# Patient Record
Sex: FEMALE | Race: ASIAN | NOT HISPANIC OR LATINO
[De-identification: names, ages, dates, MRNs, and addresses within clinical notes are randomized per-mention and may not be internally consistent; named-entity substitution may affect disease eponyms.]

---

## 2017-01-30 ENCOUNTER — APPOINTMENT (OUTPATIENT)
Dept: ELECTROPHYSIOLOGY | Facility: CLINIC | Age: 82
End: 2017-01-30

## 2017-07-01 ENCOUNTER — OUTPATIENT (OUTPATIENT)
Dept: OUTPATIENT SERVICES | Facility: HOSPITAL | Age: 82
LOS: 1 days | End: 2017-07-01
Payer: MEDICAID

## 2017-07-01 DIAGNOSIS — H26.9 UNSPECIFIED CATARACT: Chronic | ICD-10-CM

## 2017-07-01 DIAGNOSIS — Z98.61 CORONARY ANGIOPLASTY STATUS: Chronic | ICD-10-CM

## 2017-07-01 DIAGNOSIS — Z98.89 OTHER SPECIFIED POSTPROCEDURAL STATES: Chronic | ICD-10-CM

## 2017-07-18 DIAGNOSIS — R69 ILLNESS, UNSPECIFIED: ICD-10-CM

## 2017-08-01 PROCEDURE — G9001: CPT

## 2017-11-28 ENCOUNTER — APPOINTMENT (OUTPATIENT)
Dept: ELECTROPHYSIOLOGY | Facility: CLINIC | Age: 82
End: 2017-11-28
Payer: MEDICARE

## 2017-11-28 PROCEDURE — 93294 REM INTERROG EVL PM/LDLS PM: CPT

## 2017-11-28 PROCEDURE — 93296 REM INTERROG EVL PM/IDS: CPT

## 2018-03-12 ENCOUNTER — APPOINTMENT (OUTPATIENT)
Dept: ELECTROPHYSIOLOGY | Facility: CLINIC | Age: 83
End: 2018-03-12

## 2018-06-04 ENCOUNTER — APPOINTMENT (OUTPATIENT)
Dept: ELECTROPHYSIOLOGY | Facility: CLINIC | Age: 83
End: 2018-06-04
Payer: MEDICARE

## 2018-06-04 DIAGNOSIS — I47.1 SUPRAVENTRICULAR TACHYCARDIA: ICD-10-CM

## 2018-06-04 PROCEDURE — 93296 REM INTERROG EVL PM/IDS: CPT

## 2018-06-04 PROCEDURE — 93294 REM INTERROG EVL PM/LDLS PM: CPT

## 2018-10-01 ENCOUNTER — APPOINTMENT (OUTPATIENT)
Dept: ELECTROPHYSIOLOGY | Facility: CLINIC | Age: 83
End: 2018-10-01
Payer: MEDICARE

## 2018-10-01 DIAGNOSIS — R00.2 PALPITATIONS: ICD-10-CM

## 2018-10-01 PROCEDURE — 93296 REM INTERROG EVL PM/IDS: CPT

## 2018-10-01 PROCEDURE — 93294 REM INTERROG EVL PM/LDLS PM: CPT

## 2019-01-07 ENCOUNTER — APPOINTMENT (OUTPATIENT)
Dept: ELECTROPHYSIOLOGY | Facility: CLINIC | Age: 84
End: 2019-01-07
Payer: MEDICARE

## 2019-01-07 PROCEDURE — 93299: CPT

## 2019-01-07 PROCEDURE — 93298 REM INTERROG DEV EVAL SCRMS: CPT

## 2019-04-10 ENCOUNTER — APPOINTMENT (OUTPATIENT)
Dept: ELECTROPHYSIOLOGY | Facility: CLINIC | Age: 84
End: 2019-04-10

## 2019-05-20 ENCOUNTER — APPOINTMENT (OUTPATIENT)
Dept: ELECTROPHYSIOLOGY | Facility: CLINIC | Age: 84
End: 2019-05-20
Payer: MEDICARE

## 2019-05-20 PROCEDURE — 93296 REM INTERROG EVL PM/IDS: CPT

## 2019-05-20 PROCEDURE — 93294 REM INTERROG EVL PM/LDLS PM: CPT

## 2019-09-06 ENCOUNTER — APPOINTMENT (OUTPATIENT)
Dept: ELECTROPHYSIOLOGY | Facility: CLINIC | Age: 84
End: 2019-09-06
Payer: MEDICARE

## 2019-09-06 PROCEDURE — 93294 REM INTERROG EVL PM/LDLS PM: CPT

## 2019-09-06 PROCEDURE — 93296 REM INTERROG EVL PM/IDS: CPT

## 2020-01-01 ENCOUNTER — OUTPATIENT (OUTPATIENT)
Dept: OUTPATIENT SERVICES | Facility: HOSPITAL | Age: 85
LOS: 1 days | End: 2020-01-01
Payer: MEDICARE

## 2020-01-01 DIAGNOSIS — Z98.61 CORONARY ANGIOPLASTY STATUS: Chronic | ICD-10-CM

## 2020-01-01 DIAGNOSIS — H26.9 UNSPECIFIED CATARACT: Chronic | ICD-10-CM

## 2020-01-01 DIAGNOSIS — Z98.89 OTHER SPECIFIED POSTPROCEDURAL STATES: Chronic | ICD-10-CM

## 2020-01-22 ENCOUNTER — INPATIENT (INPATIENT)
Facility: HOSPITAL | Age: 85
LOS: 1 days | Discharge: HOME | End: 2020-01-24
Attending: INTERNAL MEDICINE | Admitting: INTERNAL MEDICINE
Payer: MEDICARE

## 2020-01-22 ENCOUNTER — TRANSCRIPTION ENCOUNTER (OUTPATIENT)
Age: 85
End: 2020-01-22

## 2020-01-22 VITALS
OXYGEN SATURATION: 100 % | TEMPERATURE: 98 F | DIASTOLIC BLOOD PRESSURE: 80 MMHG | RESPIRATION RATE: 16 BRPM | SYSTOLIC BLOOD PRESSURE: 130 MMHG | HEART RATE: 103 BPM

## 2020-01-22 DIAGNOSIS — I10 ESSENTIAL (PRIMARY) HYPERTENSION: ICD-10-CM

## 2020-01-22 DIAGNOSIS — E11.65 TYPE 2 DIABETES MELLITUS WITH HYPERGLYCEMIA: ICD-10-CM

## 2020-01-22 DIAGNOSIS — R07.9 CHEST PAIN, UNSPECIFIED: ICD-10-CM

## 2020-01-22 DIAGNOSIS — Z98.89 OTHER SPECIFIED POSTPROCEDURAL STATES: Chronic | ICD-10-CM

## 2020-01-22 DIAGNOSIS — R00.2 PALPITATIONS: ICD-10-CM

## 2020-01-22 DIAGNOSIS — E78.5 HYPERLIPIDEMIA, UNSPECIFIED: ICD-10-CM

## 2020-01-22 DIAGNOSIS — Z29.9 ENCOUNTER FOR PROPHYLACTIC MEASURES, UNSPECIFIED: ICD-10-CM

## 2020-01-22 DIAGNOSIS — I24.9 ACUTE ISCHEMIC HEART DISEASE, UNSPECIFIED: ICD-10-CM

## 2020-01-22 DIAGNOSIS — Z95.0 PRESENCE OF CARDIAC PACEMAKER: Chronic | ICD-10-CM

## 2020-01-22 DIAGNOSIS — H26.9 UNSPECIFIED CATARACT: Chronic | ICD-10-CM

## 2020-01-22 DIAGNOSIS — Z98.61 CORONARY ANGIOPLASTY STATUS: Chronic | ICD-10-CM

## 2020-01-22 LAB
ALBUMIN SERPL ELPH-MCNC: 4.4 G/DL — SIGNIFICANT CHANGE UP (ref 3.3–5)
ALP SERPL-CCNC: 79 U/L — SIGNIFICANT CHANGE UP (ref 40–120)
ALT FLD-CCNC: 21 U/L — SIGNIFICANT CHANGE UP (ref 4–33)
ANION GAP SERPL CALC-SCNC: 12 MMO/L — SIGNIFICANT CHANGE UP (ref 7–14)
APTT BLD: 29.9 SEC — SIGNIFICANT CHANGE UP (ref 27.5–36.3)
AST SERPL-CCNC: 24 U/L — SIGNIFICANT CHANGE UP (ref 4–32)
BASE EXCESS BLDV CALC-SCNC: 1.5 MMOL/L — SIGNIFICANT CHANGE UP
BASOPHILS # BLD AUTO: 0.04 K/UL — SIGNIFICANT CHANGE UP (ref 0–0.2)
BASOPHILS NFR BLD AUTO: 0.4 % — SIGNIFICANT CHANGE UP (ref 0–2)
BILIRUB SERPL-MCNC: 0.3 MG/DL — SIGNIFICANT CHANGE UP (ref 0.2–1.2)
BLOOD GAS VENOUS - CREATININE: 0.81 MG/DL — SIGNIFICANT CHANGE UP (ref 0.5–1.3)
BUN SERPL-MCNC: 23 MG/DL — SIGNIFICANT CHANGE UP (ref 7–23)
CALCIUM SERPL-MCNC: 9.1 MG/DL — SIGNIFICANT CHANGE UP (ref 8.4–10.5)
CHLORIDE BLDV-SCNC: 106 MMOL/L — SIGNIFICANT CHANGE UP (ref 96–108)
CHLORIDE SERPL-SCNC: 103 MMOL/L — SIGNIFICANT CHANGE UP (ref 98–107)
CHOLEST SERPL-MCNC: 98 MG/DL — LOW (ref 120–199)
CO2 SERPL-SCNC: 24 MMOL/L — SIGNIFICANT CHANGE UP (ref 22–31)
CREAT SERPL-MCNC: 0.88 MG/DL — SIGNIFICANT CHANGE UP (ref 0.5–1.3)
EOSINOPHIL # BLD AUTO: 0.06 K/UL — SIGNIFICANT CHANGE UP (ref 0–0.5)
EOSINOPHIL NFR BLD AUTO: 0.6 % — SIGNIFICANT CHANGE UP (ref 0–6)
GAS PNL BLDV: 141 MMOL/L — SIGNIFICANT CHANGE UP (ref 136–146)
GLUCOSE BLDC GLUCOMTR-MCNC: 206 MG/DL — HIGH (ref 70–99)
GLUCOSE BLDC GLUCOMTR-MCNC: 254 MG/DL — HIGH (ref 70–99)
GLUCOSE BLDC GLUCOMTR-MCNC: 270 MG/DL — HIGH (ref 70–99)
GLUCOSE BLDC GLUCOMTR-MCNC: 286 MG/DL — HIGH (ref 70–99)
GLUCOSE BLDC GLUCOMTR-MCNC: 324 MG/DL — HIGH (ref 70–99)
GLUCOSE BLDV-MCNC: 247 MG/DL — HIGH (ref 70–99)
GLUCOSE SERPL-MCNC: 255 MG/DL — HIGH (ref 70–99)
HBA1C BLD-MCNC: 8.6 % — HIGH (ref 4–5.6)
HCO3 BLDV-SCNC: 23 MMOL/L — SIGNIFICANT CHANGE UP (ref 20–27)
HCT VFR BLD CALC: 40.2 % — SIGNIFICANT CHANGE UP (ref 34.5–45)
HCT VFR BLD CALC: 44.1 % — SIGNIFICANT CHANGE UP (ref 34.5–45)
HCT VFR BLDV CALC: 40.8 % — SIGNIFICANT CHANGE UP (ref 34.5–45)
HDLC SERPL-MCNC: 50 MG/DL — SIGNIFICANT CHANGE UP (ref 45–65)
HGB BLD-MCNC: 13.1 G/DL — SIGNIFICANT CHANGE UP (ref 11.5–15.5)
HGB BLD-MCNC: 14.5 G/DL — SIGNIFICANT CHANGE UP (ref 11.5–15.5)
HGB BLDV-MCNC: 13.3 G/DL — SIGNIFICANT CHANGE UP (ref 11.5–15.5)
IMM GRANULOCYTES NFR BLD AUTO: 0.3 % — SIGNIFICANT CHANGE UP (ref 0–1.5)
INR BLD: 1 — SIGNIFICANT CHANGE UP (ref 0.88–1.17)
LACTATE BLDV-MCNC: 2.2 MMOL/L — HIGH (ref 0.5–2)
LIPID PNL WITH DIRECT LDL SERPL: 36 MG/DL — SIGNIFICANT CHANGE UP
LYMPHOCYTES # BLD AUTO: 3.79 K/UL — HIGH (ref 1–3.3)
LYMPHOCYTES # BLD AUTO: 38.4 % — SIGNIFICANT CHANGE UP (ref 13–44)
MAGNESIUM SERPL-MCNC: 2 MG/DL — SIGNIFICANT CHANGE UP (ref 1.6–2.6)
MCHC RBC-ENTMCNC: 29.5 PG — SIGNIFICANT CHANGE UP (ref 27–34)
MCHC RBC-ENTMCNC: 29.6 PG — SIGNIFICANT CHANGE UP (ref 27–34)
MCHC RBC-ENTMCNC: 32.6 % — SIGNIFICANT CHANGE UP (ref 32–36)
MCHC RBC-ENTMCNC: 32.9 % — SIGNIFICANT CHANGE UP (ref 32–36)
MCV RBC AUTO: 89.6 FL — SIGNIFICANT CHANGE UP (ref 80–100)
MCV RBC AUTO: 90.7 FL — SIGNIFICANT CHANGE UP (ref 80–100)
MONOCYTES # BLD AUTO: 0.67 K/UL — SIGNIFICANT CHANGE UP (ref 0–0.9)
MONOCYTES NFR BLD AUTO: 6.8 % — SIGNIFICANT CHANGE UP (ref 2–14)
NEUTROPHILS # BLD AUTO: 5.27 K/UL — SIGNIFICANT CHANGE UP (ref 1.8–7.4)
NEUTROPHILS NFR BLD AUTO: 53.5 % — SIGNIFICANT CHANGE UP (ref 43–77)
NRBC # FLD: 0 K/UL — SIGNIFICANT CHANGE UP (ref 0–0)
NRBC # FLD: 0 K/UL — SIGNIFICANT CHANGE UP (ref 0–0)
PCO2 BLDV: 54 MMHG — HIGH (ref 41–51)
PH BLDV: 7.32 PH — SIGNIFICANT CHANGE UP (ref 7.32–7.43)
PLATELET # BLD AUTO: 229 K/UL — SIGNIFICANT CHANGE UP (ref 150–400)
PLATELET # BLD AUTO: 251 K/UL — SIGNIFICANT CHANGE UP (ref 150–400)
PMV BLD: 9.5 FL — SIGNIFICANT CHANGE UP (ref 7–13)
PMV BLD: 9.8 FL — SIGNIFICANT CHANGE UP (ref 7–13)
PO2 BLDV: < 24 MMHG — LOW (ref 35–40)
POTASSIUM BLDV-SCNC: 3.5 MMOL/L — SIGNIFICANT CHANGE UP (ref 3.4–4.5)
POTASSIUM SERPL-MCNC: 3.7 MMOL/L — SIGNIFICANT CHANGE UP (ref 3.5–5.3)
POTASSIUM SERPL-SCNC: 3.7 MMOL/L — SIGNIFICANT CHANGE UP (ref 3.5–5.3)
PROT SERPL-MCNC: 7.9 G/DL — SIGNIFICANT CHANGE UP (ref 6–8.3)
PROTHROM AB SERPL-ACNC: 11.4 SEC — SIGNIFICANT CHANGE UP (ref 9.8–13.1)
RBC # BLD: 4.43 M/UL — SIGNIFICANT CHANGE UP (ref 3.8–5.2)
RBC # BLD: 4.92 M/UL — SIGNIFICANT CHANGE UP (ref 3.8–5.2)
RBC # FLD: 12.5 % — SIGNIFICANT CHANGE UP (ref 10.3–14.5)
RBC # FLD: 12.6 % — SIGNIFICANT CHANGE UP (ref 10.3–14.5)
SAO2 % BLDV: 30.9 % — LOW (ref 60–85)
SODIUM SERPL-SCNC: 139 MMOL/L — SIGNIFICANT CHANGE UP (ref 135–145)
TRIGL SERPL-MCNC: 46 MG/DL — SIGNIFICANT CHANGE UP (ref 10–149)
TROPONIN T, HIGH SENSITIVITY: 15 NG/L — SIGNIFICANT CHANGE UP (ref ?–14)
TROPONIN T, HIGH SENSITIVITY: 21 NG/L — SIGNIFICANT CHANGE UP (ref ?–14)
TSH SERPL-MCNC: 1.16 UIU/ML — SIGNIFICANT CHANGE UP (ref 0.27–4.2)
WBC # BLD: 11.2 K/UL — HIGH (ref 3.8–10.5)
WBC # BLD: 9.86 K/UL — SIGNIFICANT CHANGE UP (ref 3.8–10.5)
WBC # FLD AUTO: 11.2 K/UL — HIGH (ref 3.8–10.5)
WBC # FLD AUTO: 9.86 K/UL — SIGNIFICANT CHANGE UP (ref 3.8–10.5)

## 2020-01-22 PROCEDURE — 71046 X-RAY EXAM CHEST 2 VIEWS: CPT | Mod: 26

## 2020-01-22 PROCEDURE — 93280 PM DEVICE PROGR EVAL DUAL: CPT | Mod: 26

## 2020-01-22 RX ORDER — ENOXAPARIN SODIUM 100 MG/ML
40 INJECTION SUBCUTANEOUS DAILY
Refills: 0 | Status: DISCONTINUED | OUTPATIENT
Start: 2020-01-22 | End: 2020-01-24

## 2020-01-22 RX ORDER — ASPIRIN/CALCIUM CARB/MAGNESIUM 324 MG
81 TABLET ORAL DAILY
Refills: 0 | Status: DISCONTINUED | OUTPATIENT
Start: 2020-01-22 | End: 2020-01-24

## 2020-01-22 RX ORDER — METOPROLOL TARTRATE 50 MG
50 TABLET ORAL DAILY
Refills: 0 | Status: DISCONTINUED | OUTPATIENT
Start: 2020-01-22 | End: 2020-01-24

## 2020-01-22 RX ORDER — DEXTROSE 50 % IN WATER 50 %
12.5 SYRINGE (ML) INTRAVENOUS ONCE
Refills: 0 | Status: DISCONTINUED | OUTPATIENT
Start: 2020-01-22 | End: 2020-01-24

## 2020-01-22 RX ORDER — SIMVASTATIN 20 MG/1
40 TABLET, FILM COATED ORAL AT BEDTIME
Refills: 0 | Status: DISCONTINUED | OUTPATIENT
Start: 2020-01-22 | End: 2020-01-24

## 2020-01-22 RX ORDER — DEXTROSE 50 % IN WATER 50 %
25 SYRINGE (ML) INTRAVENOUS ONCE
Refills: 0 | Status: DISCONTINUED | OUTPATIENT
Start: 2020-01-22 | End: 2020-01-24

## 2020-01-22 RX ORDER — INSULIN NPH HUM/REG INSULIN HM 70-30/ML
35 VIAL (ML) SUBCUTANEOUS
Refills: 0 | Status: DISCONTINUED | OUTPATIENT
Start: 2020-01-22 | End: 2020-01-22

## 2020-01-22 RX ORDER — INSULIN NPH HUM/REG INSULIN HM 70-30/ML
25 VIAL (ML) SUBCUTANEOUS
Refills: 0 | Status: DISCONTINUED | OUTPATIENT
Start: 2020-01-22 | End: 2020-01-22

## 2020-01-22 RX ORDER — FAMOTIDINE 10 MG/ML
20 INJECTION INTRAVENOUS ONCE
Refills: 0 | Status: COMPLETED | OUTPATIENT
Start: 2020-01-22 | End: 2020-01-22

## 2020-01-22 RX ORDER — LISINOPRIL 2.5 MG/1
1 TABLET ORAL
Qty: 0 | Refills: 0 | DISCHARGE

## 2020-01-22 RX ORDER — SODIUM CHLORIDE 9 MG/ML
1000 INJECTION, SOLUTION INTRAVENOUS
Refills: 0 | Status: DISCONTINUED | OUTPATIENT
Start: 2020-01-22 | End: 2020-01-24

## 2020-01-22 RX ORDER — FUROSEMIDE 40 MG
1 TABLET ORAL
Qty: 0 | Refills: 0 | DISCHARGE

## 2020-01-22 RX ORDER — INSULIN LISPRO 100/ML
VIAL (ML) SUBCUTANEOUS
Refills: 0 | Status: DISCONTINUED | OUTPATIENT
Start: 2020-01-22 | End: 2020-01-24

## 2020-01-22 RX ORDER — INSULIN GLARGINE 100 [IU]/ML
24 INJECTION, SOLUTION SUBCUTANEOUS AT BEDTIME
Refills: 0 | Status: DISCONTINUED | OUTPATIENT
Start: 2020-01-22 | End: 2020-01-23

## 2020-01-22 RX ORDER — FUROSEMIDE 40 MG
40 TABLET ORAL DAILY
Refills: 0 | Status: DISCONTINUED | OUTPATIENT
Start: 2020-01-22 | End: 2020-01-24

## 2020-01-22 RX ORDER — LISINOPRIL 2.5 MG/1
10 TABLET ORAL DAILY
Refills: 0 | Status: DISCONTINUED | OUTPATIENT
Start: 2020-01-22 | End: 2020-01-24

## 2020-01-22 RX ORDER — PRASUGREL 5 MG/1
5 TABLET, FILM COATED ORAL DAILY
Refills: 0 | Status: DISCONTINUED | OUTPATIENT
Start: 2020-01-22 | End: 2020-01-22

## 2020-01-22 RX ORDER — CLOPIDOGREL BISULFATE 75 MG/1
75 TABLET, FILM COATED ORAL DAILY
Refills: 0 | Status: DISCONTINUED | OUTPATIENT
Start: 2020-01-22 | End: 2020-01-24

## 2020-01-22 RX ORDER — ACETAMINOPHEN 500 MG
650 TABLET ORAL EVERY 6 HOURS
Refills: 0 | Status: DISCONTINUED | OUTPATIENT
Start: 2020-01-22 | End: 2020-01-24

## 2020-01-22 RX ORDER — DEXTROSE 50 % IN WATER 50 %
15 SYRINGE (ML) INTRAVENOUS ONCE
Refills: 0 | Status: DISCONTINUED | OUTPATIENT
Start: 2020-01-22 | End: 2020-01-24

## 2020-01-22 RX ORDER — INSULIN NPH HUM/REG INSULIN HM 70-30/ML
35 VIAL (ML) SUBCUTANEOUS
Qty: 0 | Refills: 0 | DISCHARGE

## 2020-01-22 RX ORDER — INSULIN LISPRO 100/ML
8 VIAL (ML) SUBCUTANEOUS
Refills: 0 | Status: DISCONTINUED | OUTPATIENT
Start: 2020-01-22 | End: 2020-01-24

## 2020-01-22 RX ORDER — INSULIN LISPRO 100/ML
VIAL (ML) SUBCUTANEOUS AT BEDTIME
Refills: 0 | Status: DISCONTINUED | OUTPATIENT
Start: 2020-01-22 | End: 2020-01-24

## 2020-01-22 RX ORDER — GLUCAGON INJECTION, SOLUTION 0.5 MG/.1ML
1 INJECTION, SOLUTION SUBCUTANEOUS ONCE
Refills: 0 | Status: DISCONTINUED | OUTPATIENT
Start: 2020-01-22 | End: 2020-01-24

## 2020-01-22 RX ORDER — METOPROLOL TARTRATE 50 MG
25 TABLET ORAL DAILY
Refills: 0 | Status: DISCONTINUED | OUTPATIENT
Start: 2020-01-22 | End: 2020-01-22

## 2020-01-22 RX ADMIN — Medication 3: at 13:58

## 2020-01-22 RX ADMIN — ENOXAPARIN SODIUM 40 MILLIGRAM(S): 100 INJECTION SUBCUTANEOUS at 13:49

## 2020-01-22 RX ADMIN — Medication 1 TABLET(S): at 13:49

## 2020-01-22 RX ADMIN — FAMOTIDINE 20 MILLIGRAM(S): 10 INJECTION INTRAVENOUS at 04:08

## 2020-01-22 RX ADMIN — Medication 1: at 22:42

## 2020-01-22 RX ADMIN — LISINOPRIL 10 MILLIGRAM(S): 2.5 TABLET ORAL at 13:57

## 2020-01-22 RX ADMIN — Medication 8 UNIT(S): at 20:38

## 2020-01-22 RX ADMIN — Medication 2: at 10:23

## 2020-01-22 RX ADMIN — Medication 30 MILLILITER(S): at 04:08

## 2020-01-22 RX ADMIN — SIMVASTATIN 40 MILLIGRAM(S): 20 TABLET, FILM COATED ORAL at 22:40

## 2020-01-22 RX ADMIN — Medication 81 MILLIGRAM(S): at 13:48

## 2020-01-22 RX ADMIN — Medication 25 MILLIGRAM(S): at 13:57

## 2020-01-22 RX ADMIN — PRASUGREL 5 MILLIGRAM(S): 5 TABLET, FILM COATED ORAL at 15:37

## 2020-01-22 RX ADMIN — INSULIN GLARGINE 24 UNIT(S): 100 INJECTION, SOLUTION SUBCUTANEOUS at 22:41

## 2020-01-22 NOTE — PROGRESS NOTE ADULT - ASSESSMENT
Assessment:    1.  Chest pain  2.  CAD  3.  Severe MR  4.  Mild pulmonary HTN  5.  HTN  6.  HLD  7.  DM    Plan:    1.  Etiology is most likely multifactorial.  However given patient's extensive cardiac history and symptoms will proceed with cardiac cath.  Plan was discussed with patient and patient's daughter at bedside who are in agreement.  2.  Interventionalist recommend dual antiplatelet therapy indefinitely given patient's extensive CAD.  Given patient's age I will discontinue Effient and initiate Plavix instead given the increase bleeding risk with Effient.  Continue aspirin and statins.  3.  TTE findings from october done as out patient was reviewed and remarkable for severe MR and mild pulmonary HTN.  Will obtain repeat TTE         Thank you     Plan was discussed with Dr. Betzy Pettit    [facility]  Betzy Pettit D.O., [facility], MACARIO Zhu, MSN, AGPCNP-BC      [address], [address]  [place] [address]  [phone] Assessment:    1.  Chest pain  2.  Atrial tachycardia  3.  PPM  4.  CAD  5.  Severe MR  6.  Mild pulmonary HTN  7.  HTN  8.  HLD  9.  DM    Plan:    1.  Etiology is most likely multifactorial.  However given patient's extensive cardiac history and symptoms will proceed with cardiac cath.  Plan was discussed with patient and patient's daughter at bedside who are in agreement.  2.  PPM interrogation findings reviewed revealed appropriate device function.  Patient has hx of atrial tachycardia revealed on previous interrogation reports, which may be the etiology of patients symptoms.  Will up titrate beta blockers for symptom relief    3.  Interventionalist recommend dual antiplatelet therapy indefinitely given patient's extensive CAD.  Given patient's age I will discontinue Effient and initiate Plavix instead given the increase bleeding risk with Effient.  Continue aspirin and statins.  4.  TTE findings from october done as out patient was reviewed and remarkable for severe MR and mild pulmonary HTN.  Will obtain repeat TTE         Thank you     Plan was discussed with Dr. Betzy Pettit    [facility]  Betzy Pettit D.O., [facility], MACARIO Zhu, MSN, AGPCNP-[address], [address]  [place] [address]  [phone]

## 2020-01-22 NOTE — H&P ADULT - HISTORY OF PRESENT ILLNESS
84 y/o F with PMH of CAD (s/p stent in 2018 x2, 2007), IDDM, HTN, HLD and bradycardia (s/p dual chamber pacemaker 2016) presents w substernal chest pain at rest, SOB and palpitations. Pt speaks Malayalam, son provided translation and supplemental history. Pt was sitting down when she developed substernal 2/10 chest pain radiating to the back. Episode lasted about 10 minutes and resolved on its own. Pt claims this is different than chest pain in the past requiring stents, and felt more like palpitations. Pt has also reported intermittent palpitations, described feeling her heart beating fast with SOB, lasting several seconds 3-4 times per day for the past few weeks. Her ability to walk without SOB has been steadily decreasing the past several months, is able to get around the house, cook and perform chores but has difficulty walking more than a block. Denies SOB at rest, cough and PND. Pt had her pacemaker tested last week at outpatient cardiology office, no abnormalities reported. Pt was given oxygen, 4 81 ASA and 1 SL nitro by EMS, CP and palpitations had already resolved. 1 episode of palpitations in ED around 4am, pt is currently stable and has no complaints. Denies unintentional weight change, fever/chills, visual changes, tinnitus, vertigo, wheezing, abdominal pain, n/v, change in bowel movements, dysuria, weakness, paresthesias, AMS, HA, recent travel or illness. 86 y/o Female, with a PmHx of CAD (s/p stent in 2018 x 2 @ [facility] and 1 stent in 2007), IDDM, HTN, HLD and bradycardia (s/p Medtronic dual chamber pacemaker 2016 - placed in [place] while visiting her son), presents to the [facility] ED with substernal chest pain at rest, SOB and palpitations. Pt speaks Malayalam, son provided translation and supplemental history. Pt was sitting down when she developed substernal @ 1630hrs yesterday. She described the pain as a 2/10 chest pain radiating to the back. Episode lasted about 10 minutes and resolved on its own. Pt claims this is different than chest pain in the past requiring stents, and felt more like palpitations. Pt has also reported intermittent palpitations, described feeling her heart beating fast with SOB, lasting several seconds 3-4 times per day for the past few weeks. Her ability to walk without SOB has been steadily decreasing the past several months, is able to get around the house, cook and perform chores but has difficulty walking more than a block. Denies SOB at rest, cough and PND. Pt had her pacemaker tested last week at outpatient cardiology office, no abnormalities reported. Pt was given oxygen, 4 81 ASA and 1 SL nitro by EMS, CP and palpitations had already resolved. Pt reported a second episode of palpitations while in the [facility] ED around 0400hrs today. Pt is currently stable and has no complaints. Denies unintentional weight change, fever/chills, visual changes, tinnitus, vertigo, wheezing, abdominal pain, n/v, change in bowel movements, dysuria, weakness, paresthesias, AMS, HA, or illness. Last travel was in November to [facility]. She is now being admitted to telemetry for r/o acs.

## 2020-01-22 NOTE — H&P ADULT - NEGATIVE OPHTHALMOLOGIC SYMPTOMS
no pain L/no diplopia/no pain R/no loss of vision R/no photophobia/no blurred vision L/no loss of vision L/no blurred vision R

## 2020-01-22 NOTE — H&P ADULT - MUSCULOSKELETAL
details… detailed exam normal/normal strength/no joint warmth/no calf tenderness/no joint swelling/no joint erythema/ROM intact

## 2020-01-22 NOTE — H&P ADULT - PROBLEM SELECTOR PLAN 1
Troponin 16 --> 21   trend CE, serial EKGs  continue ASA 81mg, prasugrel 5mg Trop 16 --> 21  trend CE, serial EKGs  house cardiology following  continue ASA, prasugrel Trop 16 --> 21  trend CE, serial EKGs  Cardio c/s Dr. Pettit  Consider Ischemic w/u  Echo ordered  PPM interrogation called  continue ASA, prasugrel

## 2020-01-22 NOTE — H&P ADULT - RS GEN PE MLT RESP DETAILS PC
respirations non-labored/clear to auscultation bilaterally/airway patent/no chest wall tenderness/no intercostal retractions/no wheezes/good air movement/no rales/normal/no rhonchi/breath sounds equal

## 2020-01-22 NOTE — ED ADULT TRIAGE NOTE - CHIEF COMPLAINT QUOTE
Pt. c/o chest pain x 1hr. Hx. pacemaker 324 asa and 1 SL nitro. Pt. stated after medication pain went away.

## 2020-01-22 NOTE — PROGRESS NOTE ADULT - SUBJECTIVE AND OBJECTIVE BOX
CHIEF COMPLAINT:  chest pain    HISTORY OF PRESENT ILLNESS:  Patient is an 85 year old female with a past medical history significant for CAD s/p PCI in LAD, PPM (medtronic), Atral tachycardia, HTN, HLD, and DM who presents with worsening chest pain and dyspnea.  Patient's daughter states yesterday her symptoms were getting worse with associated palpitations, lasting 10 minutes, not assocated with any particular activity, and would come and go.  EMS was called and patient symptoms was relieved by nitroglycerin.  Patient then experienced chest burning.  She received Maalox in the ER which relieved her symptoms.  Patient has been noncompliant with follow ups at our office and was scheduled for stress MPI in october due to her complaints of dyspnea.      PAST MEDICAL & SURGICAL HISTORY:  Bradycardia: s/p PPM  Scalp cyst  Breast lump in female: right  CAD (coronary artery disease)  Hyperlipidemia  HTN (hypertension)  DM (diabetes mellitus)  Cardiac pacemaker: Medtronic dual chamber PPM placed 2016 in [place]  Bilateral cataracts: removal and IOL implanted in 2012  H/O coronary angioplasty: Cardiac stent 2007,  and x2 in 2018  H/O lumpectomy: right breast - 2008          MEDICATIONS:  aspirin enteric coated 81 milliGRAM(s) Oral daily  clopidogrel Tablet 75 milliGRAM(s) Oral daily  enoxaparin Injectable 40 milliGRAM(s) SubCutaneous daily  furosemide    Tablet 40 milliGRAM(s) Oral daily  lisinopril 10 milliGRAM(s) Oral daily  metoprolol succinate ER 25 milliGRAM(s) Oral daily        acetaminophen   Tablet .. 650 milliGRAM(s) Oral every 6 hours PRN      dextrose 40% Gel 15 Gram(s) Oral once PRN  dextrose 50% Injectable 12.5 Gram(s) IV Push once  dextrose 50% Injectable 25 Gram(s) IV Push once  dextrose 50% Injectable 25 Gram(s) IV Push once  glucagon  Injectable 1 milliGRAM(s) IntraMuscular once PRN  insulin glargine Injectable (LANTUS) 24 Unit(s) SubCutaneous at bedtime  insulin lispro (HumaLOG) corrective regimen sliding scale   SubCutaneous three times a day before meals  insulin lispro (HumaLOG) corrective regimen sliding scale   SubCutaneous at bedtime  insulin lispro Injectable (HumaLOG) 8 Unit(s) SubCutaneous three times a day before meals  simvastatin 40 milliGRAM(s) Oral at bedtime    calcium carbonate 1250 mG  + Vitamin D (OsCal 500 + D) 1 Tablet(s) Oral daily  dextrose 5%. 1000 milliLiter(s) IV Continuous <Continuous>            REVIEW OF SYSTEMS:  CONSTITUTIONAL: No fever, weight loss, or fatigue  EYES: No eye pain, visual disturbances, or discharge  ENMT:  No difficulty hearing, tinnitus, vertigo; No sinus or throat pain  NECK: No pain or stiffness  RESPIRATORY: No cough, wheezing, chills or hemoptysis; No Shortness of Breath  CARDIOVASCULAR: Positive chest pain, palpitations, and dyspnea No passing out, dizziness, or leg swelling  GASTROINTESTINAL: No abdominal or epigastric pain. No nausea, vomiting, or hematemesis; No diarrhea or constipation. No melena or hematochezia.  GENITOURINARY: No dysuria, frequency, hematuria, or incontinence  NEUROLOGICAL: No headaches, memory loss, loss of strength, numbness, or tremors  SKIN: No itching, burning, rashes, or lesions   LYMPH Nodes: No enlarged glands  ENDOCRINE: No heat or cold intolerance; No hair loss  MUSCULOSKELETAL: No joint pain or swelling; No muscle, back, or extremity pain  PSYCHIATRIC: No depression, anxiety, mood swings, or difficulty sleeping  HEME/LYMPH: No easy bruising, or bleeding gums  ALLERY AND IMMUNOLOGIC: No hives or eczema	        PHYSICAL EXAM:  T(C): 36.6 (01-22-20 @ 13:11), Max: 36.9 (01-22-20 @ 01:31)  HR: 85 (01-22-20 @ 13:11) (80 - 103)  BP: 141/65 (01-22-20 @ 13:11) (130/80 - 179/70)  RR: 17 (01-22-20 @ 13:11) (16 - 29)  SpO2: 100% (01-22-20 @ 13:11) (99% - 100%)  Wt(kg): --  I&O's Summary      Appearance: Normal	  HEENT:   Normal oral mucosa, PERRL, EOMI	  Lymphatic: No lymphadenopathy  Cardiovascular: Normal S1 S2, No JVD, No murmurs, No edema  Respiratory: Lungs clear to auscultation	  Psychiatry: A & O x 3, Mood & affect appropriate  Gastrointestinal:  Soft, Non-tender, + BS	  Skin: No rashes, No ecchymoses, No cyanosis	  Neurologic: Non-focal  Extremities: Normal range of motion, No clubbing, cyanosis or edema  Vascular: Peripheral pulses palpable 2+ bilaterally    TELEMETRY: 	V pacing    ECG:  	Ventricular pacing   RADIOLOGY:  OTHER: 	  	  LABS:	 Reviewed	    CARDIAC MARKERS:  negative trops x 2                                   13.1   9.86  )-----------( 229      ( 22 Jan 2020 07:40 )             40.2     01-22    139  |  103  |  23  ----------------------------<  255<H>  3.7   |  24  |  0.88    Ca    9.1      22 Jan 2020 02:20  Mg     2.0     01-22    TPro  7.9  /  Alb  4.4  /  TBili  0.3  /  DBili  x   /  AST  24  /  ALT  21  /  AlkPhos  79  01-22    proBNP:   Lipid Profile:   HgA1c: Hemoglobin A1C, Whole Blood: 8.6 % (01-22 @ 07:40)    TSH: Thyroid Stimulating Hormone, Serum: 1.16 uIU/mL (01-22 @ 07:40)

## 2020-01-22 NOTE — H&P ADULT - NEGATIVE NEUROLOGICAL SYMPTOMS
no vertigo/no loss of consciousness/no transient paralysis/no loss of sensation/no difficulty walking/no weakness/no generalized seizures/no headache/no confusion/no syncope/no tremors/no paresthesias

## 2020-01-22 NOTE — ED PROVIDER NOTE - CLINICAL SUMMARY MEDICAL DECISION MAKING FREE TEXT BOX
85F presenting with chest pain. no active chest pain. unclear if chest pain is exertional. similar symptoms in the past requiring stents. will get labs, ekg. given 4 aspirin and nitro by ems.

## 2020-01-22 NOTE — CHART NOTE - NSCHARTNOTEFT_GEN_A_CORE
Tele PA Note    Pt was admitted to Dr. Mcarthur by ED but her outpatient Cardiologist is Dr. Betzy Pettit. Spoke with Dr. Pettit and she said to change the attending to Dr. Huang and she will consult for cardiology. Spoke with Dr. Mcarthur to let him know and is okay with the transfer of Care. Dr. Huang was called as well to let him know of the change to his service.    Min RUIZ

## 2020-01-22 NOTE — ED PROVIDER NOTE - PHYSICAL EXAMINATION
General: well appearing female, no acute distress   HEENT: normocephalic, atraumatic   Respiratory: normal work of breathing, lungs clear to auscultation bilaterally   Cardiac: regular rate and rhythm   Abdomen: soft, non-tender, no guarding or rebound   MSK: no swelling or tenderness of lower extremities, moving all extremities spontaneously   Skin: warm, dry   Neuro: A&Ox3  Psych: appropriate affect

## 2020-01-22 NOTE — H&P ADULT - ATTENDING COMMENTS
Seen and examined . daughter in room . I feel fine with no CP etc. Trop x 2 negative . Sugars running high with elevated HgbA1C so needs clsoe outpt follow up.  Awaiting recommendations from cardiology .

## 2020-01-22 NOTE — H&P ADULT - NSHPLANGTRANSLATORFT_GEN_A_CORE
Pt/family refused free translation service, Son (Ryan Toscano ([phone]) at bedside provided translation

## 2020-01-22 NOTE — H&P ADULT - NEGATIVE ENMT SYMPTOMS
no sinus symptoms/no hearing difficulty/no ear pain/no tinnitus/no vertigo no sinus symptoms/no nasal congestion/no ear pain/no tinnitus/no vertigo/no nasal discharge/no hearing difficulty

## 2020-01-22 NOTE — H&P ADULT - NEUROLOGICAL DETAILS
no spontaneous movement/sensation intact/alert and oriented x 3/cranial nerves intact/normal strength

## 2020-01-22 NOTE — H&P ADULT - NEGATIVE MUSCULOSKELETAL SYMPTOMS
no joint swelling/no stiffness/no muscle weakness/no arthralgia/no muscle cramps/no arthritis no stiffness/no muscle cramps/no neck pain/no muscle weakness/no arthralgia/no arthritis/no joint swelling

## 2020-01-22 NOTE — H&P ADULT - NEGATIVE GASTROINTESTINAL SYMPTOMS
no nausea/no hematochezia/no diarrhea/no constipation/no abdominal pain/no vomiting no nausea/no hematochezia/no change in bowel habits/no abdominal pain/no diarrhea/no constipation/no vomiting

## 2020-01-22 NOTE — PROCEDURE NOTE - PROCEDURE DATE TIME, MLM
Tried to contact patient to schedule an appointment. No answer. Left a message to call office    22-Jan-2020 12:00

## 2020-01-22 NOTE — H&P ADULT - PROBLEM SELECTOR PLAN 2
admit to telemetry  interrogate pacemaker  worsening SOB, consider TTE admit to telemetry  good capture on EKG, interrogate pacemaker  hx of worsening SOB, consider TTE admit to telemetry  good capture on EKG, interrogate pacemaker (Medtronic)  Echo ordered

## 2020-01-22 NOTE — H&P ADULT - PROBLEM SELECTOR PLAN 5
continue simvastatin  TLP continue simvastatin, ezetimide   TLD continue simvastatin, hold ezetimide for now (non-formulary)  fasting lipid profile

## 2020-01-22 NOTE — ED PROVIDER NOTE - PMH
Breast lump in female  right  CAD (coronary artery disease)    DM (diabetes mellitus)    HTN (hypertension)    Hyperlipidemia    Scalp cyst

## 2020-01-22 NOTE — ED PROVIDER NOTE - ATTENDING CONTRIBUTION TO CARE
TYE Guidry MD performed a history and physical exam of the patient and discussed their management with the resident and /or advanced care provider. I reviewed the resident and /or ACP's note and agree with the documented findings and plan of care. My medical decision making and observations are found above.    Awake, Alert, Conversant.  Resting comfortably.  Breath sounds clear in all lung fields.  Normal and regular heart rate without murmurs, rubs, or gallops.  Normal S1/S2.  Abdomen soft and nontender.  No lower extremity swelling or tenderness.  Oriented and conversant with fluent speech, moving all extremities with good strength.    Dr. Guidry: I agree with the above provided history and exam and addend/modify it as follows.  85F Hx htn, dm, hld, cad s/p stents P/W chest pain and palpitations Present intermittently since yesterday evening.  Resolved at the time of arrival.  Concern for ACS.  Will R/O pneumothorax.  Doubt infectious etiology given lack of fever.  Plan for labs, EKG, CXR, anticipate admission

## 2020-01-22 NOTE — ED PROVIDER NOTE - OBJECTIVE STATEMENT
85F, pmh of htn, dm, hld, cad s/p stents, presenting with chest pain and palpitations. had chest pain and palpitations earlier in the day which resolved but then returned tonight. had similar symptoms in the past before needing her stents. no fever, cough, difficulty breathing, abdominal pain, nausea or vomiting.

## 2020-01-22 NOTE — ED ADULT NURSE NOTE - OBJECTIVE STATEMENT
Pt is an 85yr old female, A&Ox4 and ambulatory, complaining of intermittent midsternal chest pain starting today. PMH of 3 stents, last stent placed in 2018. Pt reports SOB accompanied with chest pain. As per pt, EMS gave 1 sublingual nitro and four 81mg aspirin with relief. Pt has pacemaker for "low heart rate" and takes anticoagulants. Breathing even and unlabored. Denies SOB, headache, dizziness, abd. pain, N/V, fever/chills, and dysuria. EMS line placed in right AC. 20 gauge IV in the left AC. Labs sent. VS as noted. Paced rhythm on the monitor. NAD. Will continue to monitor.

## 2020-01-22 NOTE — H&P ADULT - NSICDXPASTSURGICALHX_GEN_ALL_CORE_FT
PAST SURGICAL HISTORY:  Bilateral cataracts removal and IOL implanted in 2012    H/O coronary angioplasty and stent 2007, 2018    H/O lumpectomy right breast - 2008 PAST SURGICAL HISTORY:  Bilateral cataracts removal and IOL implanted in 2012    Cardiac pacemaker Medtronic dual chamber PPM placed 2016 in [place]    H/O coronary angioplasty Cardiac stent 2007,  and x2 in 2018    H/O lumpectomy right breast - 2008

## 2020-01-22 NOTE — ED ADULT NURSE REASSESSMENT NOTE - NS ED NURSE REASSESS COMMENT FT1
Received report from day shift RN. patient appears comfortable in bed, family at bedside, denies chest pain, SOB, nausea, vomiting at this time. NSR on monitor. Vitals as noted. Report given to Zora MORALES. IV clean and intact.  Appears in no distress at this time, ambulated to bathroom without assist.

## 2020-01-22 NOTE — H&P ADULT - NSHPPHYSICALEXAM_GEN_ALL_CORE
Vital Signs Last 24 Hrs  T(C): 36 (22 Jan 2020 08:44), Max: 36.9 (22 Jan 2020 01:31)  T(F): 96.8 (22 Jan 2020 08:44), Max: 98.5 (22 Jan 2020 01:31)  HR: 80 (22 Jan 2020 08:44) (80 - 103)  BP: 153/64 (22 Jan 2020 08:44) (130/80 - 179/70)  BP(mean): --  RR: 29 (22 Jan 2020 08:44) (16 - 29)  SpO2: 99% (22 Jan 2020 08:44) (99% - 100%)    EKG: A-paced @ 92, T inv I, AVL, ST dep V2

## 2020-01-22 NOTE — H&P ADULT - NSICDXPASTMEDICALHX_GEN_ALL_CORE_FT
PAST MEDICAL HISTORY:  Bradycardia dual chamber pacemaker 2016    Breast lump in female right    CAD (coronary artery disease) stent x1 2007, stent x2 2018    DM (diabetes mellitus)     HTN (hypertension)     Hyperlipidemia     Scalp cyst PAST MEDICAL HISTORY:  Bradycardia s/p PPM    Breast lump in female right    CAD (coronary artery disease)     DM (diabetes mellitus)     HTN (hypertension)     Hyperlipidemia     Scalp cyst

## 2020-01-22 NOTE — H&P ADULT - ASSESSMENT
86 y/o F with PMH of CAD (s/p stent in 2018 x2, 2007), IDDM, HTN, HLD and bradycardia (s/p dual chamber pacemaker 2016) presents w substernal chest pain, palpitations and SOB. Pt has 3-4 episodes of palpitations w/ SOB daily. Presenting hyperglycemic, 255 serum BG, and elevated WBC of 11.2     CXR: Clear lungs.     EKG: atrial sensed ventricular paced pacemaker, rate @ 92bpm. good capture, regular rhythm. 84 y/o Female, with a PmHx of CAD (s/p stent in 2018 x2, 2007), IDDM, HTN, HLD and bradycardia (s/p Medtronic dual chamber pacemaker 2016), presents to the [facility] ED with substernal chest pain, palpitations and SOB. Pt has 3-4 episodes of palpitations w/ SOB daily. Admitted to telemetry for r/o acs.    CXR: Clear lungs.

## 2020-01-22 NOTE — ED ADULT NURSE REASSESSMENT NOTE - NS ED NURSE REASSESS COMMENT FT1
Pt resting comfortably in bed at this time. Pt reports feeling better. Breathing even and unlabored. Denies chest pain, SOB, headache, and dizziness at this time. VS as noted. NSR on the cardiac monitor. Will continue to monitor.

## 2020-01-22 NOTE — PROCEDURE NOTE - INTERROGATION NOTE: COMMENTS
Normal pacing and sensing.  Capture thresholds evaluated via iterative testing. NO events Battery life approximately 6 years.

## 2020-01-22 NOTE — H&P ADULT - PROBLEM SELECTOR PLAN 3
Serum glucose 255  FBS, adjust ISS  continue Humulin 70/30 SC  diabetic diet  order HgbA1-c serum glucose 255  FBS, adjust ISS  continue humalin 70/20 sc  order HbgA1c   diabetic diet serum glucose 255  FBS, adjust ISS  continue humulin 70/30 sc  order HbgA1c   diabetic diet

## 2020-01-22 NOTE — H&P ADULT - PROBLEM SELECTOR PLAN 4
Hypertensive to 179/70 in ED, downtrending to 143/62  continue metoprolol, furosemide, lisinopril   DASH diet continue furosemide, lisinopril, metoprolol succinate  DASH diet continue furosemide, lisinopril, metoprolol succinate  DASH diet  monitor bp, adjust as needed

## 2020-01-22 NOTE — H&P ADULT - GASTROINTESTINAL DETAILS
bowel sounds normal/normal/soft/nontender/no distention no guarding/bowel sounds normal/normal/soft/no distention/nontender

## 2020-01-23 LAB
ANION GAP SERPL CALC-SCNC: 11 MMO/L — SIGNIFICANT CHANGE UP (ref 7–14)
BASOPHILS # BLD AUTO: 0.05 K/UL — SIGNIFICANT CHANGE UP (ref 0–0.2)
BASOPHILS NFR BLD AUTO: 0.5 % — SIGNIFICANT CHANGE UP (ref 0–2)
BUN SERPL-MCNC: 16 MG/DL — SIGNIFICANT CHANGE UP (ref 7–23)
CALCIUM SERPL-MCNC: 9.3 MG/DL — SIGNIFICANT CHANGE UP (ref 8.4–10.5)
CHLORIDE SERPL-SCNC: 103 MMOL/L — SIGNIFICANT CHANGE UP (ref 98–107)
CO2 SERPL-SCNC: 23 MMOL/L — SIGNIFICANT CHANGE UP (ref 22–31)
CREAT SERPL-MCNC: 0.77 MG/DL — SIGNIFICANT CHANGE UP (ref 0.5–1.3)
EOSINOPHIL # BLD AUTO: 0.09 K/UL — SIGNIFICANT CHANGE UP (ref 0–0.5)
EOSINOPHIL NFR BLD AUTO: 0.9 % — SIGNIFICANT CHANGE UP (ref 0–6)
GLUCOSE BLDC GLUCOMTR-MCNC: 208 MG/DL — HIGH (ref 70–99)
GLUCOSE BLDC GLUCOMTR-MCNC: 273 MG/DL — HIGH (ref 70–99)
GLUCOSE BLDC GLUCOMTR-MCNC: 283 MG/DL — HIGH (ref 70–99)
GLUCOSE BLDC GLUCOMTR-MCNC: 305 MG/DL — HIGH (ref 70–99)
GLUCOSE BLDC GLUCOMTR-MCNC: 323 MG/DL — HIGH (ref 70–99)
GLUCOSE SERPL-MCNC: 216 MG/DL — HIGH (ref 70–99)
HCT VFR BLD CALC: 44 % — SIGNIFICANT CHANGE UP (ref 34.5–45)
HGB BLD-MCNC: 14.4 G/DL — SIGNIFICANT CHANGE UP (ref 11.5–15.5)
IMM GRANULOCYTES NFR BLD AUTO: 0.3 % — SIGNIFICANT CHANGE UP (ref 0–1.5)
LYMPHOCYTES # BLD AUTO: 4.74 K/UL — HIGH (ref 1–3.3)
LYMPHOCYTES # BLD AUTO: 45.3 % — HIGH (ref 13–44)
MCHC RBC-ENTMCNC: 29.3 PG — SIGNIFICANT CHANGE UP (ref 27–34)
MCHC RBC-ENTMCNC: 32.7 % — SIGNIFICANT CHANGE UP (ref 32–36)
MCV RBC AUTO: 89.4 FL — SIGNIFICANT CHANGE UP (ref 80–100)
MONOCYTES # BLD AUTO: 0.75 K/UL — SIGNIFICANT CHANGE UP (ref 0–0.9)
MONOCYTES NFR BLD AUTO: 7.2 % — SIGNIFICANT CHANGE UP (ref 2–14)
NEUTROPHILS # BLD AUTO: 4.81 K/UL — SIGNIFICANT CHANGE UP (ref 1.8–7.4)
NEUTROPHILS NFR BLD AUTO: 45.8 % — SIGNIFICANT CHANGE UP (ref 43–77)
NRBC # FLD: 0 K/UL — SIGNIFICANT CHANGE UP (ref 0–0)
PLATELET # BLD AUTO: 290 K/UL — SIGNIFICANT CHANGE UP (ref 150–400)
PMV BLD: 9.8 FL — SIGNIFICANT CHANGE UP (ref 7–13)
POTASSIUM SERPL-MCNC: 4.1 MMOL/L — SIGNIFICANT CHANGE UP (ref 3.5–5.3)
POTASSIUM SERPL-SCNC: 4.1 MMOL/L — SIGNIFICANT CHANGE UP (ref 3.5–5.3)
RBC # BLD: 4.92 M/UL — SIGNIFICANT CHANGE UP (ref 3.8–5.2)
RBC # FLD: 12.5 % — SIGNIFICANT CHANGE UP (ref 10.3–14.5)
SODIUM SERPL-SCNC: 137 MMOL/L — SIGNIFICANT CHANGE UP (ref 135–145)
WBC # BLD: 10.47 K/UL — SIGNIFICANT CHANGE UP (ref 3.8–10.5)
WBC # FLD AUTO: 10.47 K/UL — SIGNIFICANT CHANGE UP (ref 3.8–10.5)

## 2020-01-23 PROCEDURE — 93010 ELECTROCARDIOGRAM REPORT: CPT

## 2020-01-23 PROCEDURE — 93458 L HRT ARTERY/VENTRICLE ANGIO: CPT | Mod: 26,59

## 2020-01-23 PROCEDURE — 99152 MOD SED SAME PHYS/QHP 5/>YRS: CPT

## 2020-01-23 PROCEDURE — 92920 PRQ TRLUML C ANGIOP 1ART&/BR: CPT | Mod: LC,53

## 2020-01-23 RX ORDER — LISINOPRIL 2.5 MG/1
5 TABLET ORAL ONCE
Refills: 0 | Status: COMPLETED | OUTPATIENT
Start: 2020-01-23 | End: 2020-01-23

## 2020-01-23 RX ORDER — INSULIN GLARGINE 100 [IU]/ML
30 INJECTION, SOLUTION SUBCUTANEOUS AT BEDTIME
Refills: 0 | Status: DISCONTINUED | OUTPATIENT
Start: 2020-01-23 | End: 2020-01-24

## 2020-01-23 RX ORDER — SODIUM CHLORIDE 9 MG/ML
500 INJECTION INTRAMUSCULAR; INTRAVENOUS; SUBCUTANEOUS
Refills: 0 | Status: DISCONTINUED | OUTPATIENT
Start: 2020-01-23 | End: 2020-01-24

## 2020-01-23 RX ORDER — INFLUENZA VIRUS VACCINE 15; 15; 15; 15 UG/.5ML; UG/.5ML; UG/.5ML; UG/.5ML
0.5 SUSPENSION INTRAMUSCULAR ONCE
Refills: 0 | Status: DISCONTINUED | OUTPATIENT
Start: 2020-01-23 | End: 2020-01-24

## 2020-01-23 RX ADMIN — LISINOPRIL 5 MILLIGRAM(S): 2.5 TABLET ORAL at 04:18

## 2020-01-23 RX ADMIN — CLOPIDOGREL BISULFATE 75 MILLIGRAM(S): 75 TABLET, FILM COATED ORAL at 07:50

## 2020-01-23 RX ADMIN — Medication 40 MILLIGRAM(S): at 06:21

## 2020-01-23 RX ADMIN — SIMVASTATIN 40 MILLIGRAM(S): 20 TABLET, FILM COATED ORAL at 22:20

## 2020-01-23 RX ADMIN — SODIUM CHLORIDE 100 MILLILITER(S): 9 INJECTION INTRAMUSCULAR; INTRAVENOUS; SUBCUTANEOUS at 15:15

## 2020-01-23 RX ADMIN — Medication 50 MILLIGRAM(S): at 06:21

## 2020-01-23 RX ADMIN — Medication 8 UNIT(S): at 18:08

## 2020-01-23 RX ADMIN — INSULIN GLARGINE 30 UNIT(S): 100 INJECTION, SOLUTION SUBCUTANEOUS at 22:38

## 2020-01-23 RX ADMIN — LISINOPRIL 10 MILLIGRAM(S): 2.5 TABLET ORAL at 06:21

## 2020-01-23 RX ADMIN — Medication 4: at 16:58

## 2020-01-23 RX ADMIN — Medication 8 UNIT(S): at 12:33

## 2020-01-23 RX ADMIN — Medication 4: at 12:34

## 2020-01-23 RX ADMIN — Medication 81 MILLIGRAM(S): at 07:50

## 2020-01-23 RX ADMIN — Medication 1: at 22:38

## 2020-01-23 NOTE — CHART NOTE - NSCHARTNOTEFT_GEN_A_CORE
Patient is s/p cardiac cath via right femoral access.  Site is stable with no hematoma, active bleed or swelling.  Dressing is clean/dry/intact.  PT pulse is palpable.  Patient without complaints.  Continue to monitor.

## 2020-01-23 NOTE — PROGRESS NOTE ADULT - SUBJECTIVE AND OBJECTIVE BOX
CHIEF COMPLAINT: chest pain      MEDICATIONS:  aspirin enteric coated 81 milliGRAM(s) Oral daily  clopidogrel Tablet 75 milliGRAM(s) Oral daily  enoxaparin Injectable 40 milliGRAM(s) SubCutaneous daily  furosemide    Tablet 40 milliGRAM(s) Oral daily  lisinopril 10 milliGRAM(s) Oral daily  metoprolol succinate ER 50 milliGRAM(s) Oral daily        acetaminophen   Tablet .. 650 milliGRAM(s) Oral every 6 hours PRN      dextrose 40% Gel 15 Gram(s) Oral once PRN  dextrose 50% Injectable 12.5 Gram(s) IV Push once  dextrose 50% Injectable 25 Gram(s) IV Push once  dextrose 50% Injectable 25 Gram(s) IV Push once  glucagon  Injectable 1 milliGRAM(s) IntraMuscular once PRN  insulin glargine Injectable (LANTUS) 24 Unit(s) SubCutaneous at bedtime  insulin lispro (HumaLOG) corrective regimen sliding scale   SubCutaneous three times a day before meals  insulin lispro (HumaLOG) corrective regimen sliding scale   SubCutaneous at bedtime  insulin lispro Injectable (HumaLOG) 8 Unit(s) SubCutaneous three times a day before meals  simvastatin 40 milliGRAM(s) Oral at bedtime    calcium carbonate 1250 mG  + Vitamin D (OsCal 500 + D) 1 Tablet(s) Oral daily  dextrose 5%. 1000 milliLiter(s) IV Continuous <Continuous>          Allergies    penicillin (Swelling)    Intolerances    	        PHYSICAL EXAM:  T(C): 36.9 (01-23-20 @ 06:18), Max: 37.2 (01-22-20 @ 21:53)  HR: 65 (01-23-20 @ 07:56) (65 - 85)  BP: 160/71 (01-23-20 @ 07:56) (141/65 - 185/88)  RR: 16 (01-23-20 @ 06:18) (16 - 18)  SpO2: 100% (01-23-20 @ 06:18) (96% - 100%)  Wt(kg): --  I&O's Summary      Appearance: Normal	  Cardiovascular: Normal S1 S2, No JVD, No murmurs, No edema  Respiratory: Lungs clear to auscultation	  Psychiatry: A & O x 3, Mood & affect appropriate  Gastrointestinal:  Soft, Non-tender, + BS	  Skin: No rashes, No ecchymoses, No cyanosis	  Neurologic: Non-focal  Extremities: Normal range of motion, No clubbing, cyanosis or edema      TELEMETRY: 	      	  LABS:	 reviewed	    CARDIAC MARKERS:                                  14.4   10.47 )-----------( 290      ( 23 Jan 2020 05:34 )             44.0     01-23    137  |  103  |  16  ----------------------------<  216<H>  4.1   |  23  |  0.77    Ca    9.3      23 Jan 2020 05:34  Mg     2.0     01-22    TPro  7.9  /  Alb  4.4  /  TBili  0.3  /  DBili  x   /  AST  24  /  ALT  21  /  AlkPhos  79  01-22    proBNP:   Lipid Profile:   HgA1c:   TSH:

## 2020-01-23 NOTE — PROVIDER CONTACT NOTE (OTHER) - ACTION/TREATMENT ORDERED:
PA notified. Ordered to recheck BP 30 minutes later. Will complete order and will continue to monitor

## 2020-01-23 NOTE — PROGRESS NOTE ADULT - ASSESSMENT
84 y/o Female, with a PmHx of CAD (s/p stent in 2018 x2, 2007), IDDM, HTN, HLD and bradycardia (s/p Medtronic dual chamber pacemaker 2016), presents to the [facility] ED with substernal chest pain, palpitations and SOB. Pt has 3-4 episodes of palpitations w/ SOB daily. Admitted to telemetry for r/o a     Problem/Plan - 1:  ·  Problem: ACS (acute coronary syndrome) with CAD   Plan: S/P Cath with unsuccessful PCI .   Echo ordered  PPM interrogation called  continue ASA, prasugrel.      Problem/Plan - 2:  ·  Problem: Palpitations.  Plan: admit to telemetry  good capture on EKG, interrogate pacemaker (Medtronic)  Echo ordered.      Problem/Plan - 3:  ·  Problem: Type 2 diabetes mellitus with hyperglycemia, with long-term current use of insulin.  Plan: serum glucose high so increased Lantus to 30 Units.   FBS, adjust ISS   HbgA1c  high . Needs outpt Endo follow up.   diabetic diet.      Problem/Plan - 4:  ·  Problem: HTN (hypertension).  Plan: continue furosemide, lisinopril, metoprolol succinate  DASH diet  monitor bp, adjust as needed.      Problem/Plan - 5:  ·  Problem: Hyperlipidemia.  Plan: continue simvastatin, hold ezetimide for now (non-formulary)  fasting lipid profile.      Problem/Plan - 6:  Problem: Need for prophylactic measure. Plan: lovenox 40 mg SC QD.    Disposition ; DC planning home tomorrow.

## 2020-01-23 NOTE — PROVIDER CONTACT NOTE (OTHER) - ACTION/TREATMENT ORDERED:
PA notified. No new orders. Will continue to monitor PA notified. No new orders yet. Will continue to monitor

## 2020-01-23 NOTE — PROVIDER CONTACT NOTE (MEDICATION) - BACKGROUND
Patient admitted for chest pain. PMH of CAD, bradycardia, scalp cyst, left breast lump, HLD, HTN, DM

## 2020-01-23 NOTE — PROGRESS NOTE ADULT - ASSESSMENT
Assessment:    1.  Chest pain  2.  Atrial tachycardia  3.  PPM  4.  CAD  5.  Severe MR  6.  Mild pulmonary HTN  7.  HTN  8.  HLD  9.  DM    Plan:    1.  Awaiting cardiac cath, further management to follow  2.  PPM interrogation findings reviewed revealed appropriate device function.  Patient has hx of atrial tachycardia revealed on previous interrogation reports, which may be the etiology of patients symptoms.  Beta blockers up titrated   3.  Continue dual antiplatelets and statins.  4.  Awaiting TTE         Thank you     Plan was discussed with Dr. Betzy Pettit    [facility]  Betzy Pettit D.O., [facility], MACARIO Zhu, MSN, AGPCNP-BC      [address] [place] [address], [address]  [place] [address]  [phone]

## 2020-01-23 NOTE — PROGRESS NOTE ADULT - SUBJECTIVE AND OBJECTIVE BOX
INTERVAL HPI/OVERNIGHT EVENTS: No new concerns.   Vital Signs Last 24 Hrs  T(C): 36.9 (23 Jan 2020 06:18), Max: 37.2 (22 Jan 2020 21:53)  T(F): 98.4 (23 Jan 2020 06:18), Max: 98.9 (22 Jan 2020 21:53)  HR: 65 (23 Jan 2020 07:56) (65 - 81)  BP: 160/71 (23 Jan 2020 07:56) (146/64 - 185/88)  BP(mean): --  RR: 16 (23 Jan 2020 06:18) (16 - 18)  SpO2: 100% (23 Jan 2020 06:18) (96% - 100%)  I&O's Summary    MEDICATIONS  (STANDING):  aspirin enteric coated 81 milliGRAM(s) Oral daily  calcium carbonate 1250 mG  + Vitamin D (OsCal 500 + D) 1 Tablet(s) Oral daily  clopidogrel Tablet 75 milliGRAM(s) Oral daily  dextrose 5%. 1000 milliLiter(s) (50 mL/Hr) IV Continuous <Continuous>  dextrose 50% Injectable 12.5 Gram(s) IV Push once  dextrose 50% Injectable 25 Gram(s) IV Push once  dextrose 50% Injectable 25 Gram(s) IV Push once  enoxaparin Injectable 40 milliGRAM(s) SubCutaneous daily  furosemide    Tablet 40 milliGRAM(s) Oral daily  insulin glargine Injectable (LANTUS) 24 Unit(s) SubCutaneous at bedtime  insulin lispro (HumaLOG) corrective regimen sliding scale   SubCutaneous three times a day before meals  insulin lispro (HumaLOG) corrective regimen sliding scale   SubCutaneous at bedtime  insulin lispro Injectable (HumaLOG) 8 Unit(s) SubCutaneous three times a day before meals  lisinopril 10 milliGRAM(s) Oral daily  metoprolol succinate ER 50 milliGRAM(s) Oral daily  simvastatin 40 milliGRAM(s) Oral at bedtime  sodium chloride 0.9%. 500 milliLiter(s) (100 mL/Hr) IV Continuous <Continuous>    MEDICATIONS  (PRN):  acetaminophen   Tablet .. 650 milliGRAM(s) Oral every 6 hours PRN Temp greater or equal to 38C (100.4F), Mild Pain (1 - 3), Moderate Pain (4 - 6)  dextrose 40% Gel 15 Gram(s) Oral once PRN Blood Glucose LESS THAN 70 milliGRAM(s)/deciliter  glucagon  Injectable 1 milliGRAM(s) IntraMuscular once PRN Glucose LESS THAN 70 milligrams/deciliter    LABS:                        14.4   10.47 )-----------( 290      ( 23 Jan 2020 05:34 )             44.0     01-23    137  |  103  |  16  ----------------------------<  216<H>  4.1   |  23  |  0.77    Ca    9.3      23 Jan 2020 05:34  Mg     2.0     01-22    TPro  7.9  /  Alb  4.4  /  TBili  0.3  /  DBili  x   /  AST  24  /  ALT  21  /  AlkPhos  79  01-22    PT/INR - ( 22 Jan 2020 02:20 )   PT: 11.4 SEC;   INR: 1.00          PTT - ( 22 Jan 2020 02:20 )  PTT:29.9 SEC    CAPILLARY BLOOD GLUCOSE      POCT Blood Glucose.: 305 mg/dL (23 Jan 2020 16:48)  POCT Blood Glucose.: 323 mg/dL (23 Jan 2020 12:27)  POCT Blood Glucose.: 208 mg/dL (23 Jan 2020 06:26)  POCT Blood Glucose.: 270 mg/dL (22 Jan 2020 22:15)  POCT Blood Glucose.: 324 mg/dL (22 Jan 2020 20:34)  POCT Blood Glucose.: 286 mg/dL (22 Jan 2020 18:25)          REVIEW OF SYSTEMS:  CONSTITUTIONAL: No fever, weight loss, or fatigue  EYES: No eye pain, visual disturbances, or discharge  ENMT:  No difficulty hearing, tinnitus, vertigo; No sinus or throat pain  RESPIRATORY: No cough, wheezing, chills or hemoptysis; No shortness of breath  CARDIOVASCULAR: No chest pain, palpitations, dizziness, or leg swelling  GASTROINTESTINAL: No abdominal or epigastric pain. No nausea, vomiting, or hematemesis; No diarrhea or constipation. No melena or hematochezia.  GENITOURINARY: No dysuria, frequency, hematuria, or incontinence  NEUROLOGICAL: No headaches, memory loss, loss of strength, numbness, or tremors      Consultant(s) Notes Reviewed:  [x ] YES  [ ] NO    PHYSICAL EXAM:  GENERAL: NAD, well-groomed, well-developed,not in any distress ,  HEAD:  Atraumatic, Normocephalic  EYES: EOMI, PERRLA, conjunctiva and sclera clear  ENMT: No tonsillar erythema, exudates, or enlargement; Moist mucous membranes, Good dentition, No lesions  NECK: Supple, No JVD, Normal thyroid  NERVOUS SYSTEM:  Alert & Oriented X3, No focal deficit   CHEST/LUNG: Good air entry bilateral with no  rales, rhonchi, wheezing, or rubs  HEART: Regular rate and rhythm; No murmurs, rubs, or gallops  ABDOMEN: Soft, Nontender, Nondistended; Bowel sounds present  EXTREMITIES:  2+ Peripheral Pulses, No clubbing, cyanosis, or edema  SKIN: No rashes or lesions    Care Discussed with Consultants/Other Providers [ x] YES  [ ] NO

## 2020-01-24 ENCOUNTER — TRANSCRIPTION ENCOUNTER (OUTPATIENT)
Age: 85
End: 2020-01-24

## 2020-01-24 VITALS
RESPIRATION RATE: 18 BRPM | SYSTOLIC BLOOD PRESSURE: 105 MMHG | HEART RATE: 85 BPM | OXYGEN SATURATION: 100 % | TEMPERATURE: 98 F | DIASTOLIC BLOOD PRESSURE: 51 MMHG

## 2020-01-24 LAB
ANION GAP SERPL CALC-SCNC: 14 MMO/L — SIGNIFICANT CHANGE UP (ref 7–14)
BUN SERPL-MCNC: 20 MG/DL — SIGNIFICANT CHANGE UP (ref 7–23)
CALCIUM SERPL-MCNC: 9.4 MG/DL — SIGNIFICANT CHANGE UP (ref 8.4–10.5)
CHLORIDE SERPL-SCNC: 99 MMOL/L — SIGNIFICANT CHANGE UP (ref 98–107)
CO2 SERPL-SCNC: 23 MMOL/L — SIGNIFICANT CHANGE UP (ref 22–31)
CREAT SERPL-MCNC: 0.84 MG/DL — SIGNIFICANT CHANGE UP (ref 0.5–1.3)
GLUCOSE BLDC GLUCOMTR-MCNC: 232 MG/DL — HIGH (ref 70–99)
GLUCOSE BLDC GLUCOMTR-MCNC: 245 MG/DL — HIGH (ref 70–99)
GLUCOSE BLDC GLUCOMTR-MCNC: 256 MG/DL — HIGH (ref 70–99)
GLUCOSE SERPL-MCNC: 253 MG/DL — HIGH (ref 70–99)
HCT VFR BLD CALC: 47 % — HIGH (ref 34.5–45)
HGB BLD-MCNC: 15.4 G/DL — SIGNIFICANT CHANGE UP (ref 11.5–15.5)
MCHC RBC-ENTMCNC: 29.7 PG — SIGNIFICANT CHANGE UP (ref 27–34)
MCHC RBC-ENTMCNC: 32.8 % — SIGNIFICANT CHANGE UP (ref 32–36)
MCV RBC AUTO: 90.7 FL — SIGNIFICANT CHANGE UP (ref 80–100)
NRBC # FLD: 0 K/UL — SIGNIFICANT CHANGE UP (ref 0–0)
PLATELET # BLD AUTO: 304 K/UL — SIGNIFICANT CHANGE UP (ref 150–400)
PMV BLD: 9.8 FL — SIGNIFICANT CHANGE UP (ref 7–13)
POTASSIUM SERPL-MCNC: 3.7 MMOL/L — SIGNIFICANT CHANGE UP (ref 3.5–5.3)
POTASSIUM SERPL-SCNC: 3.7 MMOL/L — SIGNIFICANT CHANGE UP (ref 3.5–5.3)
RBC # BLD: 5.18 M/UL — SIGNIFICANT CHANGE UP (ref 3.8–5.2)
RBC # FLD: 12.6 % — SIGNIFICANT CHANGE UP (ref 10.3–14.5)
SODIUM SERPL-SCNC: 136 MMOL/L — SIGNIFICANT CHANGE UP (ref 135–145)
WBC # BLD: 11.86 K/UL — HIGH (ref 3.8–10.5)
WBC # FLD AUTO: 11.86 K/UL — HIGH (ref 3.8–10.5)

## 2020-01-24 PROCEDURE — 93306 TTE W/DOPPLER COMPLETE: CPT | Mod: 26

## 2020-01-24 RX ORDER — CLOPIDOGREL BISULFATE 75 MG/1
1 TABLET, FILM COATED ORAL
Qty: 30 | Refills: 0
Start: 2020-01-24 | End: 2020-02-22

## 2020-01-24 RX ORDER — METOPROLOL TARTRATE 50 MG
1 TABLET ORAL
Qty: 30 | Refills: 0
Start: 2020-01-24 | End: 2020-02-22

## 2020-01-24 RX ORDER — PRASUGREL 5 MG/1
1 TABLET, FILM COATED ORAL
Qty: 0 | Refills: 0 | DISCHARGE

## 2020-01-24 RX ORDER — METOPROLOL TARTRATE 50 MG
1 TABLET ORAL
Qty: 0 | Refills: 0 | DISCHARGE

## 2020-01-24 RX ADMIN — Medication 2: at 19:02

## 2020-01-24 RX ADMIN — Medication 50 MILLIGRAM(S): at 05:02

## 2020-01-24 RX ADMIN — Medication 40 MILLIGRAM(S): at 05:02

## 2020-01-24 RX ADMIN — LISINOPRIL 10 MILLIGRAM(S): 2.5 TABLET ORAL at 05:01

## 2020-01-24 RX ADMIN — CLOPIDOGREL BISULFATE 75 MILLIGRAM(S): 75 TABLET, FILM COATED ORAL at 12:24

## 2020-01-24 RX ADMIN — Medication 3: at 13:30

## 2020-01-24 RX ADMIN — Medication 8 UNIT(S): at 13:30

## 2020-01-24 RX ADMIN — ENOXAPARIN SODIUM 40 MILLIGRAM(S): 100 INJECTION SUBCUTANEOUS at 12:24

## 2020-01-24 RX ADMIN — Medication 8 UNIT(S): at 09:09

## 2020-01-24 RX ADMIN — Medication 8 UNIT(S): at 19:03

## 2020-01-24 RX ADMIN — Medication 2: at 09:09

## 2020-01-24 RX ADMIN — Medication 81 MILLIGRAM(S): at 12:24

## 2020-01-24 RX ADMIN — Medication 1 TABLET(S): at 12:24

## 2020-01-24 NOTE — DISCHARGE NOTE PROVIDER - NSDCMRMEDTOKEN_GEN_ALL_CORE_FT
aspirin 81 mg oral tablet: 1 tab(s) orally once a day  Calcarb with D: 1 tab(s) orally once a day  clopidogrel 75 mg oral tablet: 1 tab(s) orally once a day  furosemide 40 mg oral tablet: 1 tab(s) orally once a day  HumuLIN 70/30 subcutaneous suspension: 35 unit(s) subcutaneous once a day (in the morning)  lisinopril 10 mg oral tablet: 1 tab(s) orally once a day  metoprolol succinate 50 mg oral tablet, extended release: 1 tab(s) orally once a day  simvastatin 40 mg oral tablet: 1 tab(s) orally once a day (at bedtime)  Zetia 10 mg oral tablet: 1 tab(s) orally once a day

## 2020-01-24 NOTE — DISCHARGE NOTE NURSING/CASE MANAGEMENT/SOCIAL WORK - PATIENT PORTAL LINK FT
You can access the FollowMyHealth Patient Portal offered by [facility] by registering at the following website: [email or web address]. By joining [facility]’s FollowMyHealth portal, you will also be able to view your health information using other applications (apps) compatible with our system.

## 2020-01-24 NOTE — PROGRESS NOTE ADULT - SUBJECTIVE AND OBJECTIVE BOX
Cath findings reviewed, Patient is stable from a cardiac standpoint for discharge will follow up as out patient.    Thank you

## 2020-01-24 NOTE — DISCHARGE NOTE PROVIDER - NSDCCPCAREPLAN_GEN_ALL_CORE_FT
PRINCIPAL DISCHARGE DIAGNOSIS  Diagnosis: Chest pain  Assessment and Plan of Treatment: You underwent cardiac catheterization, but no stents were placed. Medical management. Continue Lisinopril, Toprol XL (dose increased), Lasix, Aspirin, Plavix (new), and Simvastatin as prescribed. Follow up with Dr. Pettit for results of your echocardiogram; call for appointment.      SECONDARY DISCHARGE DIAGNOSES  Diagnosis: Type 2 diabetes mellitus with hyperglycemia, with long-term current use of insulin  Assessment and Plan of Treatment: Continue home diabetes medications. Monitor finger sticks pre-meal and bedtime, low salt, fat and carbohydrate diet, minimize glucose intake.  Exercise daily for at least 30 minutes and weight loss.  Follow up with primary care physician and endocrinologist for routine Hemoglobin A1C checks and management.  Follow up with your ophthalmologist for routine yearly vision exams.

## 2020-01-24 NOTE — PROGRESS NOTE ADULT - SUBJECTIVE AND OBJECTIVE BOX
INTERVAL HPI/OVERNIGHT EVENTS: No new concerns. I want to go home . daughters in room.   Vital Signs Last 24 Hrs  T(C): 36.6 (24 Jan 2020 04:56), Max: 36.8 (23 Jan 2020 18:00)  T(F): 97.9 (24 Jan 2020 04:56), Max: 98.2 (23 Jan 2020 18:00)  HR: 81 (24 Jan 2020 04:56) (81 - 89)  BP: 132/56 (24 Jan 2020 04:56) (110/60 - 132/56)  BP(mean): --  RR: 16 (24 Jan 2020 04:56) (16 - 18)  SpO2: 98% (24 Jan 2020 04:56) (98% - 100%)  I&O's Summary    MEDICATIONS  (STANDING):  aspirin enteric coated 81 milliGRAM(s) Oral daily  calcium carbonate 1250 mG  + Vitamin D (OsCal 500 + D) 1 Tablet(s) Oral daily  clopidogrel Tablet 75 milliGRAM(s) Oral daily  dextrose 5%. 1000 milliLiter(s) (50 mL/Hr) IV Continuous <Continuous>  dextrose 50% Injectable 12.5 Gram(s) IV Push once  dextrose 50% Injectable 25 Gram(s) IV Push once  dextrose 50% Injectable 25 Gram(s) IV Push once  enoxaparin Injectable 40 milliGRAM(s) SubCutaneous daily  furosemide    Tablet 40 milliGRAM(s) Oral daily  influenza   Vaccine 0.5 milliLiter(s) IntraMuscular once  insulin glargine Injectable (LANTUS) 30 Unit(s) SubCutaneous at bedtime  insulin lispro (HumaLOG) corrective regimen sliding scale   SubCutaneous three times a day before meals  insulin lispro (HumaLOG) corrective regimen sliding scale   SubCutaneous at bedtime  insulin lispro Injectable (HumaLOG) 8 Unit(s) SubCutaneous three times a day before meals  lisinopril 10 milliGRAM(s) Oral daily  metoprolol succinate ER 50 milliGRAM(s) Oral daily  simvastatin 40 milliGRAM(s) Oral at bedtime  sodium chloride 0.9%. 500 milliLiter(s) (100 mL/Hr) IV Continuous <Continuous>    MEDICATIONS  (PRN):  acetaminophen   Tablet .. 650 milliGRAM(s) Oral every 6 hours PRN Temp greater or equal to 38C (100.4F), Mild Pain (1 - 3), Moderate Pain (4 - 6)  dextrose 40% Gel 15 Gram(s) Oral once PRN Blood Glucose LESS THAN 70 milliGRAM(s)/deciliter  glucagon  Injectable 1 milliGRAM(s) IntraMuscular once PRN Glucose LESS THAN 70 milligrams/deciliter    LABS:                        15.4   11.86 )-----------( 304      ( 24 Jan 2020 06:27 )             47.0     01-24    136  |  99  |  20  ----------------------------<  253<H>  3.7   |  23  |  0.84    Ca    9.4      24 Jan 2020 06:27          CAPILLARY BLOOD GLUCOSE      POCT Blood Glucose.: 245 mg/dL (24 Jan 2020 08:49)  POCT Blood Glucose.: 273 mg/dL (23 Jan 2020 22:06)  POCT Blood Glucose.: 283 mg/dL (23 Jan 2020 17:57)  POCT Blood Glucose.: 305 mg/dL (23 Jan 2020 16:48)  POCT Blood Glucose.: 323 mg/dL (23 Jan 2020 12:27)          REVIEW OF SYSTEMS:  CONSTITUTIONAL: No fever, weight loss, or fatigue  EYES: No eye pain, visual disturbances, or discharge  ENMT:  No difficulty hearing, tinnitus, vertigo; No sinus or throat pain  NECK: No pain or stiffness  BREASTS: No pain, masses, or nipple discharge  RESPIRATORY: No cough, wheezing, chills or hemoptysis; No shortness of breath  CARDIOVASCULAR: No chest pain, palpitations, dizziness, or leg swelling  GASTROINTESTINAL: No abdominal or epigastric pain. No nausea, vomiting, or hematemesis; No diarrhea or constipation. No melena or hematochezia.  GENITOURINARY: No dysuria, frequency, hematuria, or incontinence  NEUROLOGICAL: No headaches, memory loss, loss of strength, numbness, or tremors      Consultant(s) Notes Reviewed:  [x ] YES  [ ] NO    PHYSICAL EXAM:  GENERAL: NAD, well-groomed, well-developed,not in any distress ,  HEAD:  Atraumatic, Normocephalic  EYES: EOMI, PERRLA, conjunctiva and sclera clear  ENMT: No tonsillar erythema, exudates, or enlargement; Moist mucous membranes, Good dentition, No lesions  NECK: Supple, No JVD, Normal thyroid  NERVOUS SYSTEM:  Alert & Oriented X3, No focal deficit   CHEST/LUNG: Good air entry bilateral with no  rales, rhonchi, wheezing, or rubs  HEART: Regular rate and rhythm; No murmurs, rubs, or gallops  ABDOMEN: Soft, Nontender, Nondistended; Bowel sounds present  EXTREMITIES:  2+ Peripheral Pulses, No clubbing, cyanosis, or edema  SKIN: No rashes or lesions    Care Discussed with Consultants/Other Providers [ x] YES  [ ] NO

## 2020-01-24 NOTE — PROGRESS NOTE ADULT - ASSESSMENT
84 y/o Female, with a PmHx of CAD (s/p stent in 2018 x2, 2007), IDDM, HTN, HLD and bradycardia (s/p Medtronic dual chamber pacemaker 2016), presents to the [facility] ED with substernal chest pain, palpitations and SOB. Pt has 3-4 episodes of palpitations w/ SOB daily. Admitted to telemetry for r/o a     Problem/Plan - 1:  ·  Problem: ACS (acute coronary syndrome) with CAD   Plan: S/P Cath with unsuccessful PCI .   Echo pending   PPM interrogated .  continue ASA, prasugrel.      Problem/Plan - 2:  ·  Problem: Palpitations.  Plan: admit to telemetry  good capture on EKG, interrogate pacemaker (Medtronic)  Echo ordered.      Problem/Plan - 3:  ·  Problem: Type 2 diabetes mellitus with hyperglycemia, with long-term current use of insulin.  Plan: serum glucose high so increased Lantus to 35 Units.   FBS, adjust ISS   HbgA1c  high . Needs outpt Endo follow up.   diabetic diet.      Problem/Plan - 4:  ·  Problem: HTN (hypertension).  Plan: continue furosemide, lisinopril, metoprolol succinate  DASH diet  monitor bp, adjust as needed.      Problem/Plan - 5:  ·  Problem: Hyperlipidemia.  Plan: continue simvastatin, hold ezetimide for now (non-formulary)  fasting lipid profile.      Problem/Plan - 6:  Problem: Need for prophylactic measure. Plan: lovenox 40 mg SC QD.    Disposition ; DC planning home to follow up with Cardiology ,PCP and Endocrinologist .

## 2020-01-24 NOTE — DISCHARGE NOTE PROVIDER - HOSPITAL COURSE
84 y/o Malayalam speaking female, with a PmHx of CAD w/stents (2018), PPM (Medtronic - bradycardia and complete heart block in 2016), HTN, HLD, DM, presented with chest pain. 1/23 Cardiac cath: OM 99% s/p unsuccessful intervention. Right femoral access. Medical management. PPM interrogation with no events. Echocardiogram done, results pending, can f/u outpt w/ Dr. Pettit. Serum glucose high so Lantus increased. HgbA1-c: 8.6. Patient needs outpatient Endocrine f/u.         Case discussed with Dr. Huang and patient is medically stable for discharge home. Stable for discharge per Cardiology.

## 2020-01-24 NOTE — DISCHARGE NOTE PROVIDER - CARE PROVIDER_API CALL
Betzy Pettit (DO)  Cardiology; Internal Medicine  [address], [address]  [place] [address]  Phone: [phone]  Fax: [phone]  Follow Up Time: 2 weeks

## 2020-01-27 DIAGNOSIS — Z71.89 OTHER SPECIFIED COUNSELING: ICD-10-CM

## 2020-02-18 PROBLEM — R00.1 BRADYCARDIA, UNSPECIFIED: Chronic | Status: ACTIVE | Noted: 2020-01-22

## 2020-03-01 PROCEDURE — G9001: CPT

## 2020-06-10 ENCOUNTER — APPOINTMENT (OUTPATIENT)
Dept: ELECTROPHYSIOLOGY | Facility: CLINIC | Age: 85
End: 2020-06-10

## 2020-06-23 ENCOUNTER — FORM ENCOUNTER (OUTPATIENT)
Age: 85
End: 2020-06-23

## 2020-09-10 ENCOUNTER — APPOINTMENT (OUTPATIENT)
Dept: ELECTROPHYSIOLOGY | Facility: CLINIC | Age: 85
End: 2020-09-10
Payer: MEDICARE

## 2020-09-10 PROCEDURE — 93294 REM INTERROG EVL PM/LDLS PM: CPT

## 2020-09-10 PROCEDURE — 93296 REM INTERROG EVL PM/IDS: CPT

## 2020-12-16 ENCOUNTER — APPOINTMENT (OUTPATIENT)
Dept: ELECTROPHYSIOLOGY | Facility: CLINIC | Age: 85
End: 2020-12-16
Payer: MEDICARE

## 2020-12-16 PROCEDURE — 93296 REM INTERROG EVL PM/IDS: CPT

## 2020-12-16 PROCEDURE — 93294 REM INTERROG EVL PM/LDLS PM: CPT

## 2021-01-01 ENCOUNTER — APPOINTMENT (OUTPATIENT)
Dept: ELECTROPHYSIOLOGY | Facility: CLINIC | Age: 86
End: 2021-01-01
Payer: MEDICARE

## 2021-01-01 ENCOUNTER — APPOINTMENT (OUTPATIENT)
Dept: ELECTROPHYSIOLOGY | Facility: CLINIC | Age: 86
End: 2021-01-01

## 2021-01-01 ENCOUNTER — NON-APPOINTMENT (OUTPATIENT)
Age: 86
End: 2021-01-01

## 2021-01-01 ENCOUNTER — FORM ENCOUNTER (OUTPATIENT)
Age: 86
End: 2021-01-01

## 2021-01-01 ENCOUNTER — EMERGENCY (EMERGENCY)
Facility: HOSPITAL | Age: 86
LOS: 1 days | End: 2021-01-01
Attending: STUDENT IN AN ORGANIZED HEALTH CARE EDUCATION/TRAINING PROGRAM | Admitting: STUDENT IN AN ORGANIZED HEALTH CARE EDUCATION/TRAINING PROGRAM
Payer: MEDICARE

## 2021-01-01 VITALS — HEIGHT: 64 IN

## 2021-01-01 DIAGNOSIS — Z95.0 PRESENCE OF CARDIAC PACEMAKER: Chronic | ICD-10-CM

## 2021-01-01 DIAGNOSIS — Z98.89 OTHER SPECIFIED POSTPROCEDURAL STATES: Chronic | ICD-10-CM

## 2021-01-01 DIAGNOSIS — H26.9 UNSPECIFIED CATARACT: Chronic | ICD-10-CM

## 2021-01-01 DIAGNOSIS — Z98.61 CORONARY ANGIOPLASTY STATUS: Chronic | ICD-10-CM

## 2021-01-01 PROCEDURE — 93294 REM INTERROG EVL PM/LDLS PM: CPT

## 2021-01-01 PROCEDURE — G2066: CPT

## 2021-01-01 PROCEDURE — 92950 HEART/LUNG RESUSCITATION CPR: CPT | Mod: GC

## 2021-01-01 PROCEDURE — 93298 REM INTERROG DEV EVAL SCRMS: CPT | Mod: NC

## 2021-01-01 PROCEDURE — 99291 CRITICAL CARE FIRST HOUR: CPT | Mod: 25,GC

## 2021-01-01 PROCEDURE — 93296 REM INTERROG EVL PM/IDS: CPT

## 2021-12-24 NOTE — ED PROVIDER NOTE - ATTENDING CONTRIBUTION TO CARE
87F w h/o CAD w/ stents, on asa/plavix, heart block w/ PPM in place, HTN, HLD, DM presents in cardiac arrest. As per family, patient has had progressive exertional dyspnea and chest pain over the past few weeks. Did not want to be evaluated medically. States she acutely worsened today. States she went to shower and afterwards collapse. CPR was started immediately by family. Patient was found to be in PEA by EMS, given epi x4, and brought to the ED after being down for 25 minutes. Patient remain in asystole in ED, had continuous CPR. Pulse checks w/o pulse and asystole. Son arrived and states patient would not want such intervention. Resuscitation was stopped at 659PM. Time of death called at 659pm. 87F w h/o CAD w/ stents, on asa/plavix, heart block w/ PPM in place, HTN, HLD, DM presents in cardiac arrest. As per family, patient has had progressive exertional dyspnea and chest pain over the past few weeks. Did not want to be evaluated medically. States she acutely worsened today. States she went to shower and afterwards collapse. CPR was started immediately by family. Patient was found to be in PEA by EMS, given epi x4, and brought to the ED after being down for 25 minutes. Patient remain in asystole in ED, had continuous CPR. Pulse checks w/o pulse and asystole. Son arrived and states patient would not want such intervention. Low likely of meaningful neurological recovery given patient's age, medical comorbidities, and down time. Resuscitation was stopped at 659PM. Time of death called at 659pm.

## 2021-12-24 NOTE — ED PROVIDER NOTE - CLINICAL SUMMARY MEDICAL DECISION MAKING FREE TEXT BOX
86 yo F CAD w/stents (2018), PPM (Medtronic - bradycardia and complete heart block in 2016), HTN, HLD, DM presents as a cardiac arrest with PEA as per EMS received 3 rounds of epinephrine, arrived intubated, receiving compressions here with asystole. Compressions continued in the ED, epinephrine given as per ACS protocol, calcium gluconate and bicarb given. No reversible caused identified.   Patient  at 18:59 86 yo F CAD w/stents (2018), PPM (Medtronic - bradycardia and complete heart block in 2016), HTN, HLD, DM presents as a cardiac arrest with PEA as per EMS received 3 rounds of epinephrine, arrived intubated, receiving compressions here with asystole. Compressions continued in the ED, epinephrine given as per ACS protocol, calcium gluconate and bicarb given. No reversible caused identified.   Patient  at 18:59. Case discussed with the ME office, David Martinez, case not accepted and no case number provider.

## 2021-12-24 NOTE — ED PROVIDER NOTE - NSICDXPASTMEDICALHX_GEN_ALL_CORE_FT
PAST MEDICAL HISTORY:  Bradycardia s/p PPM    Breast lump in female right    CAD (coronary artery disease)     DM (diabetes mellitus)     HTN (hypertension)     Hyperlipidemia     Scalp cyst

## 2021-12-24 NOTE — ED ADULT TRIAGE NOTE - CHIEF COMPLAINT QUOTE
Pt arrives as notification for cardiac arrest. CPR in progress, pt intubated in field. Charge RN aware and pt brought directly to Trauma Room C.

## 2021-12-24 NOTE — ED PROVIDER NOTE - NSICDXPASTSURGICALHX_GEN_ALL_CORE_FT
PAST SURGICAL HISTORY:  Bilateral cataracts removal and IOL implanted in 2012    Cardiac pacemaker Medtronic dual chamber PPM placed 2016 in [place]    H/O coronary angioplasty Cardiac stent 2007,  and x2 in 2018    H/O lumpectomy right breast - 2008

## 2021-12-24 NOTE — ED ADULT NURSE NOTE - OBJECTIVE STATEMENT
Pt arrived to trauma room C in cardiac arrest with CPR in progress via martha device.   Witnessed cardiac arrest with 25 minutes of downtime prior to arrival. Pt intubated in field by EMS with BVM ventilations.    Tibial IO in place.   Pt received 4 rounds of epi prior to arrival as well as bicarb.    BGL in field 325.

## 2021-12-24 NOTE — ED PROVIDER NOTE - PHYSICAL EXAMINATION
Gen: AAOx0, intubated, receiving compressions.   Head: NCAT  Lung: Intubated. Blt breath sounds.   CV: RRR, no murmurs, rubs or gallops  Abd: Distended.   MSK: no visible deformities  Neuro: Pupils 5mm, non-reactive.

## 2021-12-24 NOTE — ED PROVIDER NOTE - OBJECTIVE STATEMENT
86 yo F CAD w/stents (2018), PPM (Medtronic - bradycardia and complete heart block in 2016), HTN, HLD, DM presents as a cardiac arrest after initially complaining of sob. As per EMS, patient lost pulses and has a non-shockable rhythm. Received 3 rounds of epinephrine prior to arrival with no ROSC. Down time prior to arrival was approximately 25 minutes. Patient arrived, intubated, receiving compression by Kenneth device. Airway confirmed by glidascope. B lines noted on US. Patient here with asystole.

## 2022-01-14 ENCOUNTER — APPOINTMENT (OUTPATIENT)
Dept: ELECTROPHYSIOLOGY | Facility: CLINIC | Age: 87
End: 2022-01-14

## 2023-01-13 NOTE — PROCEDURE NOTE - NSPROCSEDATIONNOT_GEN_ALL_CORE
[address]  [address] [place] [address] [address]IVE  [address] [facility]  Phone: [phone]  Fax: [phone]    Jaye Petty MD        January 13, 2023    Shelton Anderson 1949 is higher risk clinically for lower risk procedure. Would ideally continue baby aspirin and hold plavix only. If unable to do so then hold both for least amount of time possible. If you have any questions or concerns, please don't hesitate to call.     Sincerely,        Jaye Petty MD
No

## 2023-08-21 NOTE — PATIENT PROFILE ADULT - HAVE YOU EXPERIENCED VIOLENCE OR A TRAUMATIC EVENT?
no Prednisone Pregnancy And Lactation Text: This medication is Pregnancy Category C and it isn't know if it is safe during pregnancy. This medication is excreted in breast milk.

## 2023-10-24 NOTE — PHARMACOTHERAPY INTERVENTION NOTE - COMMENTS
Patient's home insulin regimen is Novolog 70/30: 35 units (before breakfast) and 25 units (before dinner). Discussed with ED pharmacist and PA covering pt about the risk of hypoglycemia with mixed insulin inpt (especially with pt's cardiac history; also in the event she needs to go for a procedure, become NPO, or experiences changes to appetite, nph has a delayed peak and can be difficult to adjust inpt). Recommended changing to basal/bolus regimen (20% dose reduction to account for differences between NPH, inpt diet, etc.). Recommended Lantus 24 and Humalog 8 tid ac.
You can access the FollowMyHealth Patient Portal offered by [facility] by registering at the following website: [email or web address]. By joining [facility] FollowMyHealth portal, you will also be able to view your health information using other applications (apps) compatible with our system.
